# Patient Record
Sex: FEMALE | Race: WHITE
[De-identification: names, ages, dates, MRNs, and addresses within clinical notes are randomized per-mention and may not be internally consistent; named-entity substitution may affect disease eponyms.]

---

## 2019-11-08 ENCOUNTER — HOSPITAL ENCOUNTER (OUTPATIENT)
Dept: HOSPITAL 47 - WWCWWP | Age: 49
End: 2019-11-08
Attending: SURGERY
Payer: COMMERCIAL

## 2019-11-08 VITALS
SYSTOLIC BLOOD PRESSURE: 153 MMHG | TEMPERATURE: 98.1 F | HEART RATE: 72 BPM | RESPIRATION RATE: 18 BRPM | DIASTOLIC BLOOD PRESSURE: 88 MMHG

## 2019-11-08 VITALS — BODY MASS INDEX: 30.7 KG/M2

## 2019-11-08 DIAGNOSIS — Z53.9: Primary | ICD-10-CM

## 2019-11-08 NOTE — P.GSHP
History of Present Illness


H&P Date: 19


Chief Complaint: abnormal mammogram





Maya is a 49-year-old white female who presents for breast evaluation.  She 

had a bilateral screening mammogram performed on 96.  This revealed some 

architectural distortion in the left breast for which she was recommended 

additional views be obtained.  The patient has had a left breast ultrasound-

guided core biopsy in the past which was negative for malignancy.  She had 

diagnostic left breast mammogram which revealed dense parenchyma.  Under 

compression there still appeared to be persistent distortion in the upper outer 

aspect of the left breast.  This was not as clearly identified on the ML view.  

This was posterior to prior breast biopsy.  An ultrasound was then performed an 

ultrasound did not reveal any solid or cystic areas of concern.





The patient herself does not feel any dominant masses or nodules of concern in e

ither breast.  The patient denies any trauma to her breast.  The patient denies 

any recent infections in the breast.  The patient has not had any skin changes 

or nipple discharge of concern.





Patients drinks coffee occasionally, smoke: none, second hand smoke: no, 

chocolate: occasional





Family history:


none





Hormonal History:


menarche: 14


, breast fed: yes, first born at 24


menopause: irregular periods


BCP: none


hormones: none





History:


Negative





Surgical history:


left ankel








Social History:


smoke: none, stopped 15 years ago


alcohol: none


drugs: none











- Constitutional


Comment: 





hot flashes





- EENT


Eyes: denies blurred vision, denies pain


Ears: deny: decreased hearing, tinnitus


Ears, nose, mouth and throat: Denies headache, Denies sore throat





- Breasts


Breasts: bilateral: as per HPI





- Cardiovascular


Cardiovascular: Denies chest pain, Denies shortness of breath





- Respiratory


Respiratory: Denies cough, Denies 7





- Genitourinary (Female)


Genitourinary: Denies dysuria, Denies hematuria





- Menstruation


Menstruation: Reports premenarcheal





- Musculoskeletal


Musculoskeletal: Denies myalgias





- Integumentary


Integumentary: Denies pruritus, Denies rash





- Neurological


Neurological: Denies numbness, Denies weakness





- Psychiatric


Psychiatric: Denies anxiety, Denies depression





- Endocrine


Endocrine: Denies fatigue, Denies weight change





- Hematologic/Lymphatic


Comment: 





none





- Allergic/Immunologic


Allergic/Immunologic: Reports seasonal allergies





Past Medical History


History of Any Multi-Drug Resistant Organisms: None Reported


Smoking Status: Former smoker





Medications and Allergies


                                Home Medications











 Medication  Instructions  Recorded  Confirmed  Type


 


No Known Home Medications  19 History








                                    Allergies











Allergy/AdvReac Type Severity Reaction Status Date / Time


 


No Known Allergies Allergy   Unverified 19 10:04














Surgical - Exam


                                   Vital Signs











Temp Pulse Resp BP Pulse Ox


 


 98.1 F   72   18   153/88   98 


 


 19 10:05  19 10:05  19 10:05  19 10:05  19 10:05














BMI 30.7





- General


well developed, well nourished, no distress





- Eyes


normal ocular movement





- ENT


no hearing loss, no congestion





- Neck


no masses, trachea midline, no lymphadectomy





- Respiratory


normal expansion, normal respiratory effort, clear to auscultation





- Cardiovascular


Rhythm: regular


Heart Sounds: normal: S1, S2





- Abdomen


Abdomen: soft, non tender, no guarding, no rigid, no rebound





- Integumentary





normal turgor





- Neurologic


no disoriented, no combative





- Musculoskeletal


normal gait, normal posture





- Psychiatric


oriented to time, oriented to person, oriented to place, speech is normal, 

memory intact





Breast examination:


Bra 36 C/D wears sports bra's


ptosis Grade2


Right breast: Multiple positional exam no dominant masses or nodules of concern


Right axilla: No adenopathy of concern


Left breast: Multiple positional exam no dominant masses or nodules of concern


Left axilla: No adenopathy of concern





Results





Mammogram and ultrasound results reviewed





Assessment and Plan


Assessment: 





Fashion:


1.  Fibrocystic breast changes


2.  Abnormal mammogram


3.  Asymmetric density left breast seen only on one view necessitating jessica-

synthesis 3-D stereo biopsy


4.  Ultrasound no abnormalities noted


5.  cristiane-menopausal








Plan:


1.  Jessica-synthesis  directed 3-D stereotactic core biopsy


2.  Follow-up after biopsy





Risks and benefits of procedure discussed with the patient.  She understands and

this will be scheduled in the near future.





Time 40 minutes





CC: Dr. moore, Dr. Orozco

## 2020-05-26 ENCOUNTER — HOSPITAL ENCOUNTER (OUTPATIENT)
Dept: HOSPITAL 47 - RADMAMWWP | Age: 50
Discharge: HOME | End: 2020-05-26
Attending: SURGERY
Payer: COMMERCIAL

## 2020-05-26 DIAGNOSIS — R92.8: ICD-10-CM

## 2020-05-26 DIAGNOSIS — Z12.31: Primary | ICD-10-CM

## 2020-05-26 PROCEDURE — 77061 BREAST TOMOSYNTHESIS UNI: CPT

## 2020-05-26 PROCEDURE — 77065 DX MAMMO INCL CAD UNI: CPT

## 2020-05-26 NOTE — MM
Reason for exam: follow-up at short interval from prior study.

Last mammogram was performed 8 months ago.



History:

Patient is postmenopausal.

Benign stereotactic core biopsy of the left breast, 2019.  Benign 

ultrasound-guided core biopsy of the left breast, 2017.



Physical Findings:

Nurse did not find any significant physical abnormalities on exam.



MG 3D Diag Mammo W/Cad LT

CC and MLO view(s) were taken of the left breast.

Prior study comparison: September 13, 2019, left breast mammogram, performed at 

[facility].  September 6, 2019, bilateral mammogram, performed at 

[facility].  May 10, 2017, bilateral mammogram, performed at [facility].  February 18, 2016, left breast mammogram, performed at [facility].  February 12, 2016, left breast mammogram, performed at [facility].  February 4, 2016, bilateral mammogram, performed at [facility].

No significant new findings when compared with previous films.



These results were verbally communicated with the patient and result sheet given 

to the patient on 5/26/20.





ASSESSMENT: Benign, BI-RAD 2



RECOMMENDATION:

Routine screening mammogram of both breasts in 4 months.

Back on schedule for September 2020.

## 2020-05-29 ENCOUNTER — HOSPITAL ENCOUNTER (OUTPATIENT)
Dept: HOSPITAL 47 - WWCWWP | Age: 50
Discharge: HOME | End: 2020-05-29
Attending: SURGERY
Payer: COMMERCIAL

## 2020-05-29 VITALS
RESPIRATION RATE: 18 BRPM | TEMPERATURE: 98.5 F | SYSTOLIC BLOOD PRESSURE: 132 MMHG | DIASTOLIC BLOOD PRESSURE: 82 MMHG | HEART RATE: 76 BPM

## 2020-05-29 DIAGNOSIS — Z53.9: Primary | ICD-10-CM

## 2020-05-29 NOTE — P.PN
Subjective


Progress Note Date: 20


Principal diagnosis: 





fibrocystic disease of the left breast





   Maya is a 50-year-old white female who comes for breast evaluation.  She 

had had a screening mammogram performed on .  This revealed some 

architectural distortion in the left breast for which additional views were 

recommended.  She had stereotactic core biopsy which was negative for malignancy

on [id number].  She has no complaints at this time.  She had a repeat left breast 

mammogram performed on .  This was benign BIRADS 2 and repeat bilateral

mammogram in 4 months time 2020 is recommended.








Family history:


none





Hormonal History:


menarche: 14


, breast fed: yes, first born at 24


menopause: irregular periods


BCP: none


hormones: none





History:


Negative





Surgical history:


left cedric








Social History:


smoke: none, stopped 15 years ago


alcohol: none


drugs: none











- Constitutional


Comment: 





hot flashes





- EENT


Eyes: denies blurred vision, denies pain


Ears: deny: decreased hearing, tinnitus


Ears, nose, mouth and throat: Denies headache, Denies sore throat





- Breasts


Breasts: bilateral: as per HPI





- Cardiovascular


Cardiovascular: Denies chest pain, Denies shortness of breath





- Respiratory


Respiratory: Denies cough, Denies 7





- Genitourinary (Female)


Genitourinary: Denies dysuria, Denies hematuria





- Menstruation


Menstruation: Reports premenarcheal





- Musculoskeletal


Musculoskeletal: Denies myalgias





- Integumentary


Integumentary: Denies pruritus, Denies rash





- Neurological


Neurological: Denies numbness, Denies weakness





- Psychiatric


Psychiatric: Denies anxiety, Denies depression





- Endocrine


Endocrine: Denies fatigue, Denies weight change





- Hematologic/Lymphatic


Comment: 





none





- Allergic/Immunologic


Allergic/Immunologic: Reports seasonal allergies





Past Medical History


History of Any Multi-Drug Resistant Organisms: None Reported


Smoking Status: Former smoker





Medications and Allergies


                                Home Medications











 Medication  Instructions  Recorded  Confirmed  Type


 


No Known Home Medications  19 History








                                    Allergies











Allergy/AdvReac Type Severity Reaction Status Date / Time


 


No Known Allergies Allergy   Unverified 19 10:04




















Objective





- Vital Signs


Vital signs: 


                                   Vital Signs











Temp  98.5 F   20 15:38


 


Pulse  76   20 15:38


 


Resp  18   20 15:38


 


BP  132/82   20 15:38


 


Pulse Ox  97   20 15:38








                                 Intake & Output











 20





 18:59 06:59 18:59


 


Weight   90.718 kg














- Exam





BMI 32.3





- Constitutional


General appearance: Present: average body habitus





- EENT


Eyes: Present: EOMI


ENT: Present: hearing grossly normal





- Respiratory


Respiratory: bilateral: CTA





- Cardiovascular


Rhythm: regular


Heart sounds: normal: S1, S2





- Integumentary


Integumentary: Present: normal turgor





- Musculoskeletal


Musculoskeletal: Present: gait normal





- Psychiatric


Psychiatric: Present: A&O x's 3, appropriate affect





- Additional findings


Additional findings: 


  left breast:





inspection:  skin changes of concern


Palpation:


Left breast multiple positional exam fibrocystic changes no discrete dominant 

masses or nodules of concern


Left axilla: No adenopathy of concern











Assessment and Plan


Assessment: 





 Impression/Plan:


1.  Fibrocystic breast changes


2.  Repeat bilateral mammogram in 4 months time


3.  Follow up sooner if any questions of concern





CC: Dr. Sherrie Choe





encounter 10 minutes, > 50% of time in planning and counselling








Time with Patient: Less than 30

## 2020-10-16 ENCOUNTER — HOSPITAL ENCOUNTER (OUTPATIENT)
Dept: HOSPITAL 47 - WWCWWP | Age: 50
Discharge: HOME | End: 2020-10-16
Attending: SURGERY
Payer: COMMERCIAL

## 2020-10-16 VITALS
SYSTOLIC BLOOD PRESSURE: 129 MMHG | HEART RATE: 56 BPM | TEMPERATURE: 98.8 F | RESPIRATION RATE: 16 BRPM | DIASTOLIC BLOOD PRESSURE: 85 MMHG

## 2020-10-16 DIAGNOSIS — Z53.9: Primary | ICD-10-CM

## 2020-10-16 NOTE — P.PN
Subjective


Progress Note Date: 10/16/20


Principal diagnosis: 





fibrocystic breast disease


fibrocystic disease of the left breast





   Maya is a 50-year-old white female who comes for breast evaluation.  She 

had had a screening mammogram performed on .  This revealed some 

architectural distortion in the left breast for which additional views were 

recommended.  She had stereotactic core biopsy which was negative for malignancy

on [id number].  She has no complaints at this time.  She had a repeat left breast 

mammogram performed on .  This was benign BIRADS 2 and repeat bilateral 

mammogram in 4 months time 2020 is recommended. She had a bilateral 

mammogram on 20 which was benign BIRAD 2.  Patient does not feel any lumps 

masses or nodules in either breast which she is concerned about.  She is not 

complaining of any nipple discharge or pain in the breast.  She has no history 

of any recent trauma or infection in the breast.








Family history:


none





Hormonal History:


menarche: 14


, breast fed: yes, first born at 24


menopause: irregular periods


BCP: none


hormones: none





 Medical History:


Negative





Surgical history:


left ankle








Social History:


smoke: none, stopped 15 years ago


alcohol: none


drugs: none











- Constitutional


Comment: 





hot flashes





- EENT


Eyes: denies blurred vision, denies pain


Ears: deny: decreased hearing, tinnitus


Ears, nose, mouth and throat: Denies headache, Denies sore throat





- Breasts


Breasts: bilateral: as per HPI





- Cardiovascular


Cardiovascular: Denies chest pain, Denies shortness of breath





- Respiratory


Respiratory: Denies cough, Denies 7





- Genitourinary (Female)


Genitourinary: Denies dysuria, Denies hematuria





- Menstruation


Menstruation: Reports premenarcheal





- Musculoskeletal


Musculoskeletal: Denies myalgias





- Integumentary


Integumentary: Denies pruritus, Denies rash





- Neurological


Neurological: Denies numbness, Denies weakness





- Psychiatric


Psychiatric: Denies anxiety, Denies depression





- Endocrine


Endocrine: Denies fatigue, Denies weight change





- Hematologic/Lymphatic


Comment: 





none





- Allergic/Immunologic


Allergic/Immunologic: Reports seasonal allergies








Objective





- Vital Signs


Vital signs: 


                                   Vital Signs











Temp  98.8 F   10/16/20 12:56


 


Pulse  56 L  10/16/20 12:56


 


Resp  16   10/16/20 12:56


 


BP  129/85   10/16/20 12:56


 


Pulse Ox  97   10/16/20 12:56








                                 Intake & Output











 10/15/20 10/16/20 10/16/20





 18:59 06:59 18:59


 


Weight   86.183 kg














- Exam








BMI 30.7





- Constitutional


General appearance: Present: average body habitus





- EENT


Eyes: Present: EOMI


ENT: Present: hearing grossly normal





- Respiratory


Respiratory: bilateral: CTA





- Cardiovascular


Rhythm: regular


Heart sounds: normal: S1, S2





- Gastrointestinal


General gastrointestinal: Present: soft





- Integumentary


Integumentary: Present: normal turgor





- Musculoskeletal


Musculoskeletal: Present: gait normal





- Psychiatric


Psychiatric: Present: A&O x's 3, appropriate affect, intact judgment & insight





- Additional findings


Additional findings: 





breast exam:


BRA: sports bra's


inspection: Bilateral grade 2 ptosis


Palpation:


Right breast: Fibrocystic changes no dominant masses or nodules of concern


Right axilla: No adenopathy of concern


Left breast: Multiple positional exam no dominant masses or nodules of concern, 

fibrocystic changes


Left axilla: No adenopathy of concern





Assessment and Plan


Assessment: 





Impression:


1.  Fibrocystic breast changes


2.  Status post stereotactic core biopsy of the left breast which was benign





Plan:


1.  Repeat bilateral mammogram in 1 year with physician exam


2.  Patient is going to follow with primary care doctor she has any questions or

concerns she will follow here





Cc: Dr. Choe





encounter 15 minutes, > 50% of time in planning and counselling